# Patient Record
Sex: FEMALE | URBAN - METROPOLITAN AREA
[De-identification: names, ages, dates, MRNs, and addresses within clinical notes are randomized per-mention and may not be internally consistent; named-entity substitution may affect disease eponyms.]

---

## 2020-08-31 ENCOUNTER — IMPORTED ENCOUNTER (OUTPATIENT)
Dept: URBAN - METROPOLITAN AREA CLINIC 1 | Facility: CLINIC | Age: 45
End: 2020-08-31

## 2020-08-31 PROBLEM — H52.4: Noted: 2020-08-31

## 2020-08-31 PROBLEM — H52.13: Noted: 2020-08-31

## 2020-08-31 PROCEDURE — S0620 ROUTINE OPHTHALMOLOGICAL EXA: HCPCS

## 2020-08-31 NOTE — PATIENT DISCUSSION
1. Myopia/Presbyopia- Rx was given for correction if indicated and requested. 2. S/p Lasik OU- 10 years agoReturn for an appointment in 1 year 36 with Dr. Yoselin Sutton.

## 2022-04-02 ASSESSMENT — KERATOMETRY
OD_K1POWER_DIOPTERS: 44.00
OS_AXISANGLE2_DEGREES: 073
OS_K1POWER_DIOPTERS: 44.25
OD_K2POWER_DIOPTERS: 45.00
OD_AXISANGLE_DEGREES: 008
OS_K2POWER_DIOPTERS: 45.00
OD_AXISANGLE2_DEGREES: 098
OS_AXISANGLE_DEGREES: 163

## 2022-04-02 ASSESSMENT — VISUAL ACUITY
OS_SC: J1+
OD_CC: 20/25
OS_CC: 20/20
OD_SC: J1+

## 2022-04-02 ASSESSMENT — TONOMETRY
OS_IOP_MMHG: 12
OD_IOP_MMHG: 12